# Patient Record
Sex: MALE | Race: ASIAN | Employment: OTHER | URBAN - METROPOLITAN AREA
[De-identification: names, ages, dates, MRNs, and addresses within clinical notes are randomized per-mention and may not be internally consistent; named-entity substitution may affect disease eponyms.]

---

## 2017-06-09 PROCEDURE — 84154 ASSAY OF PSA FREE: CPT | Performed by: INTERNAL MEDICINE

## 2017-06-09 PROCEDURE — 84153 ASSAY OF PSA TOTAL: CPT | Performed by: INTERNAL MEDICINE

## 2017-12-08 PROCEDURE — 84153 ASSAY OF PSA TOTAL: CPT | Performed by: INTERNAL MEDICINE

## 2017-12-08 PROCEDURE — 84154 ASSAY OF PSA FREE: CPT | Performed by: INTERNAL MEDICINE

## 2018-06-08 PROCEDURE — 84154 ASSAY OF PSA FREE: CPT | Performed by: INTERNAL MEDICINE

## 2018-06-08 PROCEDURE — 84153 ASSAY OF PSA TOTAL: CPT | Performed by: INTERNAL MEDICINE

## 2018-12-14 PROCEDURE — 84153 ASSAY OF PSA TOTAL: CPT | Performed by: INTERNAL MEDICINE

## 2018-12-14 PROCEDURE — 84154 ASSAY OF PSA FREE: CPT | Performed by: INTERNAL MEDICINE

## 2019-04-25 ENCOUNTER — APPOINTMENT (OUTPATIENT)
Dept: GENERAL RADIOLOGY | Facility: HOSPITAL | Age: 84
DRG: 188 | End: 2019-04-25
Attending: INTERNAL MEDICINE
Payer: MEDICARE

## 2019-04-25 ENCOUNTER — APPOINTMENT (OUTPATIENT)
Dept: ULTRASOUND IMAGING | Facility: HOSPITAL | Age: 84
DRG: 188 | End: 2019-04-25
Attending: INTERNAL MEDICINE
Payer: MEDICARE

## 2019-04-25 ENCOUNTER — HOSPITAL ENCOUNTER (INPATIENT)
Facility: HOSPITAL | Age: 84
LOS: 1 days | Discharge: HOME | DRG: 188 | End: 2019-04-26
Attending: INTERNAL MEDICINE | Admitting: HOSPITALIST
Payer: MEDICARE

## 2019-04-25 DIAGNOSIS — J90 PLEURAL EFFUSION EXUDATIVE: Primary | ICD-10-CM

## 2019-04-25 PROCEDURE — 89051 BODY FLUID CELL COUNT: CPT | Performed by: INTERNAL MEDICINE

## 2019-04-25 PROCEDURE — 0W993ZZ DRAINAGE OF RIGHT PLEURAL CAVITY, PERCUTANEOUS APPROACH: ICD-10-PCS | Performed by: INTERNAL MEDICINE

## 2019-04-25 PROCEDURE — 87070 CULTURE OTHR SPECIMN AEROBIC: CPT | Performed by: INTERNAL MEDICINE

## 2019-04-25 PROCEDURE — 88305 TISSUE EXAM BY PATHOLOGIST: CPT | Performed by: INTERNAL MEDICINE

## 2019-04-25 PROCEDURE — 76604 US EXAM CHEST: CPT | Performed by: INTERNAL MEDICINE

## 2019-04-25 PROCEDURE — 85610 PROTHROMBIN TIME: CPT | Performed by: INTERNAL MEDICINE

## 2019-04-25 PROCEDURE — 83615 LACTATE (LD) (LDH) ENZYME: CPT | Performed by: INTERNAL MEDICINE

## 2019-04-25 PROCEDURE — 71045 X-RAY EXAM CHEST 1 VIEW: CPT | Performed by: INTERNAL MEDICINE

## 2019-04-25 PROCEDURE — 82945 GLUCOSE OTHER FLUID: CPT | Performed by: INTERNAL MEDICINE

## 2019-04-25 PROCEDURE — 88108 CYTOPATH CONCENTRATE TECH: CPT | Performed by: INTERNAL MEDICINE

## 2019-04-25 PROCEDURE — 87205 SMEAR GRAM STAIN: CPT | Performed by: INTERNAL MEDICINE

## 2019-04-25 PROCEDURE — 84145 PROCALCITONIN (PCT): CPT | Performed by: INTERNAL MEDICINE

## 2019-04-25 PROCEDURE — 89050 BODY FLUID CELL COUNT: CPT | Performed by: INTERNAL MEDICINE

## 2019-04-25 PROCEDURE — 84157 ASSAY OF PROTEIN OTHER: CPT | Performed by: INTERNAL MEDICINE

## 2019-04-25 RX ORDER — FINASTERIDE 5 MG/1
5 TABLET, FILM COATED ORAL DAILY
Status: DISCONTINUED | OUTPATIENT
Start: 2019-04-26 | End: 2019-04-26

## 2019-04-25 RX ORDER — MULTIPLE VITAMINS W/ MINERALS TAB 9MG-400MCG
1 TAB ORAL DAILY
Status: DISCONTINUED | OUTPATIENT
Start: 2019-04-26 | End: 2019-04-26

## 2019-04-25 RX ORDER — ONDANSETRON 2 MG/ML
4 INJECTION INTRAMUSCULAR; INTRAVENOUS EVERY 6 HOURS PRN
Status: DISCONTINUED | OUTPATIENT
Start: 2019-04-25 | End: 2019-04-26

## 2019-04-25 RX ORDER — LOSARTAN POTASSIUM 25 MG/1
50 TABLET ORAL
Status: DISCONTINUED | OUTPATIENT
Start: 2019-04-26 | End: 2019-04-26

## 2019-04-25 RX ORDER — LEVOTHYROXINE SODIUM 0.07 MG/1
75 TABLET ORAL
Status: DISCONTINUED | OUTPATIENT
Start: 2019-04-26 | End: 2019-04-26

## 2019-04-25 RX ORDER — ACETAMINOPHEN 325 MG/1
650 TABLET ORAL EVERY 6 HOURS PRN
Status: DISCONTINUED | OUTPATIENT
Start: 2019-04-25 | End: 2019-04-26

## 2019-04-25 RX ORDER — NIFEDIPINE 60 MG/1
60 TABLET, EXTENDED RELEASE ORAL
Status: DISCONTINUED | OUTPATIENT
Start: 2019-04-26 | End: 2019-04-26

## 2019-04-25 NOTE — PLAN OF CARE
Problem: Patient/Family Goals  Goal: Patient/Family Long Term Goal  Description  Patient's Long Term Goal:     Interventions:  - See additional Care Plan goals for specific interventions  Outcome: Progressing  Goal: Patient/Family Short Term Goal  Descri

## 2019-04-25 NOTE — CONSULTS
Pulmonary Consult     Assessment / Plan:  1.  Pleural effusion - concern for malignant vs infectious etiology  - risks, benefits and alternatives to diagnostic and therapeutic thoracentesis discussed with patient and he agreed with proceeding  - follow-up p Disp:  Rfl:  4/25/2019 at 0900   Chlorhexidine Gluconate (PERIDEX) 0.12 % Mouth/Throat Solution  Disp:  Rfl: 99 Unknown at Unknown time       Outpatient Medications Marked as Taking for the 4/25/19 encounter [facility] Encounter):  LOSARTAN POTASSIUM 50 MG per week: Not on file        Minutes per session: Not on file      Stress: Not on file    Relationships      Social connections:        Talks on phone: Not on file        Gets together: Not on file        Attends Worship service: Not on file        Activ

## 2019-04-25 NOTE — PROGRESS NOTES
Thoracentesis Procedure Note    Indication: Large right pleural effusion    Pre-procedure: Risks, benefits, and alternatives were discussed with patient and they agreed with proceeding. Procedural pause performed.  Ultrasound was used to identify large righ

## 2019-04-25 NOTE — PROGRESS NOTES
NURSING ADMISSION NOTE      Patient admitted via Wheelchair  Oriented to room. Safety precautions initiated. Bed in low position. Call light in reach. Pt arrived as a Direct admit d/t SOB for 1-2 weeks.  CXR showing large right pleural effusio

## 2019-04-25 NOTE — H&P
[facility] Hospitalist H&P       CC: No chief complaint on file.        PCP: Chris Butt MD    History of Present Illness:  Patient is a 80year old male with PMH sig for BPH, HTN, HL, hypothryoidism presents with ~6 month h/o of progressively worsening SOB, re Infusing Medication:    PRN Medication:       Soc Hx  Social History    Tobacco Use      Smoking status: Former Smoker        Packs/day: 1.00        Years: 10.00        Pack years: 10        Types: Cigarettes        Quit date: 1/9/1962        Years since q identified extending from the hilum which may represent atelectasis although other etiologies cannot be excluded. The left lung is clear. Curvilinear calcifications are seen within the aorta. There is no pneumothorax or pulmonary edema.  IMPRESSION 1. Large

## 2019-04-26 VITALS
TEMPERATURE: 98 F | DIASTOLIC BLOOD PRESSURE: 52 MMHG | OXYGEN SATURATION: 99 % | HEART RATE: 74 BPM | WEIGHT: 122.56 LBS | RESPIRATION RATE: 18 BRPM | SYSTOLIC BLOOD PRESSURE: 100 MMHG | BODY MASS INDEX: 22 KG/M2

## 2019-04-26 NOTE — PLAN OF CARE
Assumed care of patient @ 7030. A/ox4, answers questions appropriately for age, anxious to get home. Pt denies SOB. POD #1 R. US guided thoracentesis, tegaderm and gauze intact, skin intact, no bruising noticed.  Pt was able to ambulate in halls with cane,

## 2019-04-26 NOTE — PROGRESS NOTES
Pulmonary Progress Note        NAME: Mayte Ayers - ROOM: 6889/5526-J - MRN: [id number] - Age: 80year old - : 1922        Last 24hrs: No events overnight, ready to go home    OBJECTIVE:   19  0019 19  0456 19  0900 19  1300 February 26,2019. The new INR reference range is 0.90-1. 10.        TSH   Date/Time Value Ref Range Status   06/08/2018 09:20 AM 1.548 0.350 - 5.500 uIU/mL Final   06/09/2017 09:23 AM 1.867 0.350 - 5.500 uIU/mL Final   06/05/2015 09:56 AM 7.680 (H) 0.450 - 4

## 2019-04-26 NOTE — CM/SW NOTE
Patient discussed in rounds, may benefit from home health services, will have [facility] liaison offer services/choice.       Kristie Gibbons RN,   Phone [phone]  Pager [phone]

## 2019-04-26 NOTE — PLAN OF CARE
ASLEEP DURING ROUNDS BUT EASILY AROUSABLE ON TELE MONITOR HR 70'S SINUS RHYTHM. DENIES ANY PAIN. ALL NEEDS ATTENDED AND WILL CONTINUE TO MONITOR. CALL LIGHT WITHIN REACH.

## 2019-04-26 NOTE — DIETARY NOTE
[address]     Admitting diagnosis:  pleural effusion    Ht:  5'3\"  Wt: 55.6 kg (122 lb 9.2 oz). This is 98 % of IBW  Body mass index is 21.71 kg/m².   IBW: 56.4kg    Labs/Meds reviewed    Diet: Orders Placed This E

## 2019-04-26 NOTE — HOME CARE LIAISON
Received referral from [address] Met with pt and his daughter at the bedside. Pt is declining home health services at this time. Kim donovan.  Thank you for the referral.

## 2019-04-26 NOTE — DISCHARGE SUMMARY
General Medicine Discharge Summary     Patient ID:  Rosetta Rush  80year old  1/17/1922    Admit date: 4/25/2019    Discharge date and time: 4/26/2019  3:10 PM     Attending Physician: Lelo att. providers fo no dysuria     # HTN, HL  - cont OP regimen     # Hypothryoidism  - OP TFTs 6/2018 wnl  - cont OP regimen     # elevated PSA  - OP urology notes reviewed (7/2018) - very likely pt has prostate CA. Pt declined prostate biopsy. He has been on finasteride.     Technologist)  Patient offered no additional history at this time. FINDINGS:  No measurable pneumothorax. Cardiac silhouette is normal in size. Tortuous thoracic aorta. Aortic atherosclerosis. Mild to moderate right pleural effusion.   Right-sided vo

## 2019-04-26 NOTE — PROGRESS NOTES
Pt ambulated this AM w/walker, no o2 needed, sats 93-94% when walking.         04/26/19 0900   Mobility   Activity Ambulate in scott   Level of Assistance Independent after set-up   Assistive Device Cane   Distance (ft) 400   Ambulation Response Tolerated we

## 2019-04-29 NOTE — CM/SW NOTE
04/29/19 0842   Discharge disposition   Expected discharge disposition Home or Self   Name of [id number]   Patient Refuses Rehab Services Yes   denied home health services.

## 2019-05-03 ENCOUNTER — HOSPITAL ENCOUNTER (OUTPATIENT)
Dept: ULTRASOUND IMAGING | Facility: HOSPITAL | Age: 84
Discharge: HOME | End: 2019-05-03
Attending: INTERNAL MEDICINE
Payer: MEDICARE

## 2019-05-03 ENCOUNTER — APPOINTMENT (OUTPATIENT)
Dept: LAB | Facility: HOSPITAL | Age: 84
End: 2019-05-03
Attending: INTERNAL MEDICINE
Payer: MEDICARE

## 2019-05-03 ENCOUNTER — HOSPITAL ENCOUNTER (OUTPATIENT)
Dept: GENERAL RADIOLOGY | Facility: HOSPITAL | Age: 84
Discharge: HOME | End: 2019-05-03
Attending: INTERNAL MEDICINE
Payer: MEDICARE

## 2019-05-03 DIAGNOSIS — J90 RECURRENT RIGHT PLEURAL EFFUSION: ICD-10-CM

## 2019-05-03 PROCEDURE — 88108 CYTOPATH CONCENTRATE TECH: CPT | Performed by: INTERNAL MEDICINE

## 2019-05-03 PROCEDURE — 71045 X-RAY EXAM CHEST 1 VIEW: CPT | Performed by: INTERNAL MEDICINE

## 2019-05-03 PROCEDURE — 32555 ASPIRATE PLEURA W/ IMAGING: CPT | Performed by: INTERNAL MEDICINE

## 2019-05-03 PROCEDURE — 88305 TISSUE EXAM BY PATHOLOGIST: CPT | Performed by: INTERNAL MEDICINE

## 2019-05-03 NOTE — PROCEDURES
Thoracentesis Procedure Note    Pre-operative Diagnosis: pleural effusion    Post-operative Diagnosis: same    Indications: pleural effusion    Procedure Details   Consent: Informed consent was obtained.  Risks of the procedure were discussed including: inf

## 2019-05-08 ENCOUNTER — APPOINTMENT (OUTPATIENT)
Dept: ULTRASOUND IMAGING | Facility: HOSPITAL | Age: 84
DRG: 187 | End: 2019-05-08
Attending: INTERNAL MEDICINE
Payer: MEDICARE

## 2019-05-08 ENCOUNTER — HOSPITAL ENCOUNTER (INPATIENT)
Facility: HOSPITAL | Age: 84
LOS: 12 days | Discharge: HOME | DRG: 187 | End: 2019-05-20
Attending: INTERNAL MEDICINE | Admitting: INTERNAL MEDICINE
Payer: MEDICARE

## 2019-05-08 DIAGNOSIS — J90 PLEURAL EFFUSION: Primary | ICD-10-CM

## 2019-05-08 PROCEDURE — 0W9930Z DRAINAGE OF RIGHT PLEURAL CAVITY WITH DRAINAGE DEVICE, PERCUTANEOUS APPROACH: ICD-10-PCS | Performed by: RADIOLOGY

## 2019-05-08 PROCEDURE — 99152 MOD SED SAME PHYS/QHP 5/>YRS: CPT | Performed by: INTERNAL MEDICINE

## 2019-05-08 PROCEDURE — 32557 INSERT CATH PLEURA W/ IMAGE: CPT | Performed by: INTERNAL MEDICINE

## 2019-05-08 PROCEDURE — 85610 PROTHROMBIN TIME: CPT | Performed by: INTERNAL MEDICINE

## 2019-05-08 PROCEDURE — 85025 COMPLETE CBC W/AUTO DIFF WBC: CPT | Performed by: INTERNAL MEDICINE

## 2019-05-08 RX ORDER — ACETAMINOPHEN 325 MG/1
650 TABLET ORAL EVERY 6 HOURS PRN
Status: DISCONTINUED | OUTPATIENT
Start: 2019-05-08 | End: 2019-05-20

## 2019-05-08 RX ORDER — LEVOTHYROXINE SODIUM 0.07 MG/1
75 TABLET ORAL DAILY
Status: DISCONTINUED | OUTPATIENT
Start: 2019-05-09 | End: 2019-05-20

## 2019-05-08 RX ORDER — FLUMAZENIL 0.1 MG/ML
0.2 INJECTION, SOLUTION INTRAVENOUS AS NEEDED
Status: DISCONTINUED | OUTPATIENT
Start: 2019-05-08 | End: 2019-05-14 | Stop reason: HOSPADM

## 2019-05-08 RX ORDER — NIFEDIPINE 60 MG/1
60 TABLET, EXTENDED RELEASE ORAL
Status: DISCONTINUED | OUTPATIENT
Start: 2019-05-09 | End: 2019-05-11

## 2019-05-08 RX ORDER — FINASTERIDE 5 MG/1
5 TABLET, FILM COATED ORAL NIGHTLY
Status: DISCONTINUED | OUTPATIENT
Start: 2019-05-08 | End: 2019-05-10

## 2019-05-08 RX ORDER — LOSARTAN POTASSIUM 50 MG/1
50 TABLET ORAL
Status: DISCONTINUED | OUTPATIENT
Start: 2019-05-09 | End: 2019-05-11

## 2019-05-08 RX ORDER — NALOXONE HYDROCHLORIDE 0.4 MG/ML
INJECTION, SOLUTION INTRAMUSCULAR; INTRAVENOUS; SUBCUTANEOUS
Status: DISCONTINUED
Start: 2019-05-08 | End: 2019-05-08 | Stop reason: WASHOUT

## 2019-05-08 RX ORDER — ONDANSETRON 2 MG/ML
4 INJECTION INTRAMUSCULAR; INTRAVENOUS EVERY 6 HOURS PRN
Status: DISCONTINUED | OUTPATIENT
Start: 2019-05-08 | End: 2019-05-20

## 2019-05-08 RX ORDER — NALOXONE HYDROCHLORIDE 0.4 MG/ML
80 INJECTION, SOLUTION INTRAMUSCULAR; INTRAVENOUS; SUBCUTANEOUS AS NEEDED
Status: DISCONTINUED | OUTPATIENT
Start: 2019-05-08 | End: 2019-05-14 | Stop reason: HOSPADM

## 2019-05-08 RX ORDER — MIDAZOLAM HYDROCHLORIDE 1 MG/ML
1 INJECTION INTRAMUSCULAR; INTRAVENOUS EVERY 5 MIN PRN
Status: ACTIVE | OUTPATIENT
Start: 2019-05-08 | End: 2019-05-08

## 2019-05-08 RX ORDER — MIDAZOLAM HYDROCHLORIDE 1 MG/ML
INJECTION INTRAMUSCULAR; INTRAVENOUS
Status: DISCONTINUED
Start: 2019-05-08 | End: 2019-05-08 | Stop reason: WASHOUT

## 2019-05-08 RX ORDER — FLUMAZENIL 0.1 MG/ML
INJECTION, SOLUTION INTRAVENOUS
Status: DISCONTINUED
Start: 2019-05-08 | End: 2019-05-08 | Stop reason: WASHOUT

## 2019-05-08 RX ORDER — SODIUM CHLORIDE 9 MG/ML
INJECTION, SOLUTION INTRAVENOUS CONTINUOUS
Status: DISCONTINUED | OUTPATIENT
Start: 2019-05-08 | End: 2019-05-14 | Stop reason: HOSPADM

## 2019-05-08 NOTE — CONSULTS
Pulmonary H&P/Consult       NAME: Beatris Jones - ROOM: [facility]162-J - MRN: [id number] - Age: 80year old - :  1922    Date of Admission: 2019  8:16 AM  Admission Diagnosis: complex right pleural effusion  Reason for consult: complex pleural effusion daily. Disp:  Rfl:  5/8/2019 at Unknown time   Chlorhexidine Gluconate (PERIDEX) 0.12 % Mouth/Throat Solution  Disp:  Rfl: 99 Unknown at Unknown time   LOW-DOSE ASPIRIN OR Take  by mouth.  Disp:  Rfl:  Unknown at Unknown time     No Known Allergies    Socia sexual activity: Not on file    Other Topics      Concerns:        Not on file    Social History Narrative      Not on file         Family History:  Family History   Problem Relation Age of Onset   • Stroke Father    • No Known Problems Mother         Home Wt Readings from Last 3 Encounters:  05/08/19 : 127 lb 4.8 oz (57.7 kg)  05/02/19 : 125 lb (56.7 kg)  04/26/19 : 122 lb 9.2 oz (55.6 kg)      No intake or output data in the 24 hours ending 05/08/19 1118    /66 (BP Location: Left arm)   Pulse 7 ALPHOS, TBIL, DBIL, TPROT    Invalid input(s): ARTERIALPH, ARTERIALPO2, ARTERIALPCO2, ARTERIALHCO3    No results for input(s): BNP in the last 72 hours.     Invalid input(s): TROPI    INR   Date/Time Value Ref Range Status   05/08/2019 09:44 AM 0.86 (L) 0.9

## 2019-05-08 NOTE — H&P
[facility] Hospitalist History and Physical      No chief complaint on file.        PCP: Levi Pollock MD      History of Present Illness: Patient is a 80year old male with PMH sig for HTN, BPH, and hypothyroidism who presented for CT guided chest tube placement f Years: 10.00        Pack years: 10        Types: Cigarettes        Start date: 1940        Quit date: 1970        Years since quittin.3      Smokeless tobacco: Never Used      Tobacco comment: stop smoking cig /Smoke cigar for 8 yrs    [facility] Radiology: Xr Chest Pa + Lat Chest (cpt=71046)    Result Date: 5/2/2019  DATE OF SERVICE: 05.02.2019 CHEST PA AND LATERAL CLINICAL INFORMATION: Pleural effusion, not elsewhere classified COMPARISON STUDY: 4/25/2019.  TECHNIQUE: PA and left lateral views available.  TECHNIQUE:  Axial images of the chest were performed from the thoracic inlet through the adrenal glands without the use of intravenous contrast.  The data was reconstructed into 1.25 mm collimated axial images and coronal and sagittal reformats portions of the right middle lobe containing air bronchograms and may simply represent compressive atelectasis. Underlying mass or pneumonia cannot be excluded. 3. 5 mm nodule in the left apex. Comparison with any previous exam is recommended.  4. Scattered Tortuous thoracic aorta. Aortic atherosclerosis. Mild to moderate right pleural effusion. Right-sided volume loss noted. Right basilar consolidation is seen. CONCLUSION:  No measurable pneumothorax.     Dictated by: Tracey Orellana MD on 4/25/20 continuous pulse oximetry and cardiac monitoring under my direct supervision. The radiology nurse was in attendance throughout the exam.  Fifty mcg of Fentanyl was given.   Patient was assessed and monitoring of oxygen saturation, heart rate, and blood pre levothyroxine     #BPH  - cont finasteride     DVT prophy - SCD  Dispo: inpt care. Will require at least two midnights for complex pulmonary care. Plan of care d/w pt and daughter who agree. Outpatient records or previous hospital records reviewed.

## 2019-05-08 NOTE — PROGRESS NOTES
Pt is a 81 y/o male  direct admitted for chest tube placement due to recurrent pleural effusion. came to the room at 0850. alert oriented.hard of hearing. daughter at bedside. POC updated with pt. NPO. Chest tube placement at 1300 per IR.   S/p chest tube place

## 2019-05-08 NOTE — PROCEDURES
[facility]  Procedure Note    Coralee Cushing Patient Status:  Inpatient    1922 MRN [id number]   [facility] 5NW-A Attending Constantin Terry MD   Hosp Day # 0 PCP Casimiro Hammonds MD     Procedure: Chest tube placement    Pre-Proced

## 2019-05-08 NOTE — IMAGING NOTE
Pt tolerated procedure well-procedure done with local only at the pt's request. Vss on RA, presently denies pain and dressing is CDI.  Pt and family updated on procedure and plan of care, report called to Marlen RN and awaiting transport with Radiology R

## 2019-05-09 PROBLEM — J90 PLEURAL EFFUSION: Status: ACTIVE | Noted: 2019-05-09

## 2019-05-09 PROCEDURE — 97530 THERAPEUTIC ACTIVITIES: CPT

## 2019-05-09 PROCEDURE — 85025 COMPLETE CBC W/AUTO DIFF WBC: CPT | Performed by: INTERNAL MEDICINE

## 2019-05-09 PROCEDURE — 80048 BASIC METABOLIC PNL TOTAL CA: CPT | Performed by: INTERNAL MEDICINE

## 2019-05-09 PROCEDURE — 97165 OT EVAL LOW COMPLEX 30 MIN: CPT

## 2019-05-09 PROCEDURE — 97162 PT EVAL MOD COMPLEX 30 MIN: CPT

## 2019-05-09 PROCEDURE — 97535 SELF CARE MNGMENT TRAINING: CPT

## 2019-05-09 RX ORDER — ACETAMINOPHEN 160 MG
2000 TABLET,DISINTEGRATING ORAL DAILY
Status: DISCONTINUED | OUTPATIENT
Start: 2019-05-10 | End: 2019-05-20

## 2019-05-09 RX ORDER — BISACODYL 10 MG
10 SUPPOSITORY, RECTAL RECTAL
Status: DISCONTINUED | OUTPATIENT
Start: 2019-05-09 | End: 2019-05-20

## 2019-05-09 NOTE — PLAN OF CARE
Problem: Patient/Family Goals  Goal: Patient/Family Long Term Goal  Description  Patient's Long Term Goal: 5/8(pm) to go home    Interventions:  - get chest tube out   - See additional Care Plan goals for specific interventions   Outcome: Progressing  Go

## 2019-05-09 NOTE — PROGRESS NOTES
[facility] Hospitalist Progress Note     PCP: Nuzhat Boyle MD    CC:  Follow up    SUBJECTIVE:  Pt sitting up in bed, family member at bedside  Pain controlled  No cp/sob/n/v/f/c. +U, +BM    OBJECTIVE:  Temp:  [97.5 °F (36.4 °C)-98.9 °F (37.2 °C)] 97.6 °F (36 and nifedipine      #Hypothryroidism   - cont levothyroxine      #BPH  - cont finasteride     **hyponatremia- mild, not significant at this time     DVT prophy - SCD, ppx AC when ok with pulm     D/w AUBRIE Nunez    Questions/concerns were discussed with patient

## 2019-05-09 NOTE — PROGRESS NOTES
Francisco Shay 73 Patient Status:  Inpatient    1922 MRN [id number]   [facility] 5NW-A Attending Cherise Busby, *   Hosp Day # 1 PCP Silver Ravi MD     Pulm / Critical Care Progress Note     S: ct with > 2L outpu Value   06/05/2015 0.81   11/14/2014 0.82   05/30/2014 0.75 (L)     Creatinine (mg/dL)   Date Value   05/09/2019 0.97   04/25/2019 1.27   12/14/2018 0.98   06/08/2018 0.90   ]    No results for input(s): ALKPHOS, ALT, AST in the last 168 hours.     Invalid

## 2019-05-09 NOTE — CM/SW NOTE
Met with pt who is a 81 y/o man admitted for recurrent pleural effusions, s/p chest tube placement with plan for MIST 2 protocol per pulmonology. Pt lives with his wife and stated he has been active and independent prior to admission.   Pt is still driving

## 2019-05-09 NOTE — OCCUPATIONAL THERAPY NOTE
OCCUPATIONAL THERAPY QUICK EVALUATION - INPATIENT    Room Number: 887/112-Y  Evaluation Date: 5/9/2019     Type of Evaluation: Initial  Presenting Problem: chest tube placement    Physician Order: IP Consult to Occupational Therapy  Reason for Therapy:  AD ACTIVITIES OF DAILY LIVING ASSESSMENT  AM-PAC ‘6-Clicks’ Inpatient Daily Activity Short Form   How much help from another person does the patient currently need…  -   Putting on and taking off regular lower body clothing?: A Little   -   Bathing (in deficits impacting engagement in ADL/IADL  LOW  1 - 3 performance deficits    Client Assessment/Performance Deficits  LOW - No comorbidities nor modifications of tasks    Clinical Decision Making  LOW - Analysis of occupational profile, problem-focused ass

## 2019-05-09 NOTE — PHYSICAL THERAPY NOTE
PHYSICAL THERAPY QUICK EVALUATION - INPATIENT    Room Number: 688/469-P  Evaluation Date: 5/9/2019  Presenting Problem: pleural effusion  Physician Order: PT Eval and Treat    Problem List  Active Problems:    Pleural effusion      Past Medical History bed?: None   How much help from another person does the patient currently need. ..   -   Moving to and from a bed to a chair (including a wheelchair)?: None   -   Need to walk in hospital room?: None   -   Climbing 3-5 steps with a railing?: A Little functional mobility. RN aware. GOALS  Patient was able to achieve the following goals . ..     Patient was able to transfer Safely and independently   Patient able to ambulate on level surfaces Safely and independently

## 2019-05-09 NOTE — PROGRESS NOTES
Pt is a 81 y/o male  direct admitted for chest tube placement due to recurrent pleural effusion. s/p chest tube placement,good amount of output yesterday and slowing down today. Chest tube to -20 wall suction,hard of hearing. chest tube is intact. denies feve

## 2019-05-09 NOTE — PLAN OF CARE
Patient alert and orientated. Oxygen WNL on room air. Pt with no complaints of pain. Medications given per MAR. IV SL. Chest tube to -20 wall suction, no output this shift. Pt voiding per urinal. Instructed to call for help, call light within reach.  WCTM

## 2019-05-09 NOTE — PROGRESS NOTES
[facility]  Progress Note      Meera Lemon Patient Status:  Inpatient    1922 MRN [id number]   [facility] 5NW-A Attending Sandra Gtz, *   Hosp Day # 1 PCP Kallie Mason MD       Subjective:   Resting in bed    Objective

## 2019-05-10 ENCOUNTER — APPOINTMENT (OUTPATIENT)
Dept: GENERAL RADIOLOGY | Facility: HOSPITAL | Age: 84
DRG: 187 | End: 2019-05-10
Attending: INTERNAL MEDICINE
Payer: MEDICARE

## 2019-05-10 PROCEDURE — 71046 X-RAY EXAM CHEST 2 VIEWS: CPT | Performed by: INTERNAL MEDICINE

## 2019-05-10 PROCEDURE — 85025 COMPLETE CBC W/AUTO DIFF WBC: CPT | Performed by: HOSPITALIST

## 2019-05-10 RX ORDER — HEPARIN SODIUM 5000 [USP'U]/ML
5000 INJECTION, SOLUTION INTRAVENOUS; SUBCUTANEOUS EVERY 12 HOURS SCHEDULED
Status: DISCONTINUED | OUTPATIENT
Start: 2019-05-10 | End: 2019-05-20

## 2019-05-10 RX ORDER — FINASTERIDE 5 MG/1
5 TABLET, FILM COATED ORAL DAILY
Status: DISCONTINUED | OUTPATIENT
Start: 2019-05-11 | End: 2019-05-20

## 2019-05-10 NOTE — PROGRESS NOTES
Pulmonary Progress Note        NAME: Meera Lemon - ROOM: 071/633-X - MRN: [id number] - Age: 80year old - : 1922        Last 24hrs: No events overnight, wants to go home    OBJECTIVE:   05/09/19  2050 05/10/19  0519 05/10/19  0745 05/10/19  1015   B TROPI    INR   Date/Time Value Ref Range Status   05/08/2019 09:44 AM 0.86 (L) 0.90 - 1.10 Final     Comment:       New lot of PT reagent started on February 26,2019. The new INR reference range is 0.90-1. 10.        TSH   Date/Time Value Ref Range Status

## 2019-05-10 NOTE — PLAN OF CARE
Patient alert and orientated. Oxygen WNL on room air. Pt with no complaints of pain. Medications given per MAR. IV SL. Chest tube to -20 wall suction, 150ml out this shift. Pt voiding per urinal. Instructed to call for help, call light within reach.  WCTM

## 2019-05-10 NOTE — PROGRESS NOTES
Pt is a 79 y/o male admitted for chest tube placement due to recurrent pleural effusion. s/p chest tube placement . output slowing down today. Chest tube to -20 wall suction,water seal during ambulation and tests. hard of hearing. chest tube is intact. BP low s

## 2019-05-10 NOTE — PROGRESS NOTES
[facility] Hospitalist Progress Note     PCP: Kathleen Gonzalez MD    CC:  Follow up    SUBJECTIVE:  Pt sitting up in bed, just returned from CXR. No pain.  No n/v/f/c. comfortable    OBJECTIVE:  Temp:  [97.6 °F (36.4 °C)-98 °F (36.7 °C)] 97.8 °F (36.6 °C)  Pulse fluid    **expected anemia- HDS, monitor.  stable     #Essential HTN  - cont losartan and nifedipine      #Hypothryroidism   - cont levothyroxine      #BPH  - cont finasteride     **hyponatremia- mild, not significant at this time     DVT prophy - SCD, ppx

## 2019-05-11 NOTE — PLAN OF CARE
Pt a&o x4. [place]. Glasses. BP running on lower side, BP meds held. Otherwise VSS. On ra maintaining O2 sats. IS encouraged. Heparin. CT to -20 continuous suction, serosanguinous fluid noted. BM today. Voids. Up with 1.  Pt updated on poc, will continue to rou

## 2019-05-11 NOTE — PROGRESS NOTES
[facility] Hospitalist Progress Note     Santo Quesada Patient Status:  Inpatient    1922 MRN [id number]   [facility] 5NW-A Attending Eva Potter MD   [facility] Day # 3 PCP Levi Pollock MD     CC: follow up    SUBJECTIVE:  TOBY overnight  No Naloxone HCl, Flumazenil, acetaminophen, ondansetron HCl        Assessment/Plan:      # Loculated R pleural effusion   - s/p R chest tube  - exudative, bloody  - chest tube management per pulm     # Expected anemia  - stable  - HDS, monitor     # Essential

## 2019-05-11 NOTE — PROGRESS NOTES
Francisco Shay 73 Patient Status:  Inpatient    1922 MRN [id number]   [facility] 5NW-A Attending Flash Gonzalez MD   [address] Day # 3 PCP Anthony Burgos MD     Pulm / Critical Care Progress Note     S: ongoing output from ct, 05/09/19  0609   *   K 4.1      CO2 24.0   BUN 20*     Creatinine, Serum (mg/dL)   Date Value   06/05/2015 0.81   11/14/2014 0.82   05/30/2014 0.75 (L)     Creatinine (mg/dL)   Date Value   05/09/2019 0.97   04/25/2019 1.27   12/14/2018 0.98

## 2019-05-12 NOTE — PROGRESS NOTES
[facility] Hospitalist Progress Note     Meera Lemon Patient Status:  Inpatient    1922 MRN [id number]   [facility] 5NW-A Attending Lisseth Combs MD   [address] Day # 4 PCP Kallie Mason MD     CC: follow up    SUBJECTIVE:  CT output ~260/24 Medication:bisacodyl, Naloxone HCl, Flumazenil, acetaminophen, ondansetron HCl        Assessment/Plan:      # Loculated R pleural effusion   - s/p R chest tube  - exudative, bloody  - chest tube management per pulm  - plan to remove when output <200mL/24 h

## 2019-05-12 NOTE — PLAN OF CARE
Pt a&o x4. [place]. Glasses. VSS. Denies pain. On ra maintaining O2 sats. IS encouraged. Heparin. CT to -20 continuous suction, yellow fluid noted. Suppository given at patient request. Voids. Up with 1. Pt walked around room several times today.  Pt updated on

## 2019-05-12 NOTE — PROGRESS NOTES
ASSUMED PATIENT  CARE AT 0100. PATIENT WAS SOUNDLY ASLEEP. LAST VITAL SIGNS TAKEN AT 2018 ARE  WNL. HE HAS A RIGHT CHEST TUBE IN PLACE ATTACHED TO CONTINUOUS SUCTION. HIS BED ALARM IS ON FOR SAFETY. WILL FOLLOW.

## 2019-05-12 NOTE — PROGRESS NOTES
Francisco Zenobiamaycobeena 73 Patient Status:  Inpatient    1922 MRN [id number]   [facility] 5NW-A Attending Jesus Strickland MD   [facility] Day # 4 PCP Ted Weiss MD     Pulm / Critical Care Progress Note     S: ct drained nearly 300cc Creatinine, Serum (mg/dL)   Date Value   06/05/2015 0.81   11/14/2014 0.82   05/30/2014 0.75 (L)     Creatinine (mg/dL)   Date Value   05/09/2019 0.97   04/25/2019 1.27   12/14/2018 0.98   06/08/2018 0.90   ]    No results for input(s): ALKPHOS, ALT, A

## 2019-05-13 ENCOUNTER — APPOINTMENT (OUTPATIENT)
Dept: GENERAL RADIOLOGY | Facility: HOSPITAL | Age: 84
DRG: 187 | End: 2019-05-13
Attending: INTERNAL MEDICINE
Payer: MEDICARE

## 2019-05-13 PROCEDURE — 80048 BASIC METABOLIC PNL TOTAL CA: CPT | Performed by: INTERNAL MEDICINE

## 2019-05-13 PROCEDURE — 71045 X-RAY EXAM CHEST 1 VIEW: CPT | Performed by: INTERNAL MEDICINE

## 2019-05-13 PROCEDURE — 85027 COMPLETE CBC AUTOMATED: CPT | Performed by: INTERNAL MEDICINE

## 2019-05-13 NOTE — PROGRESS NOTES
[facility]  Progress Note      Deisi Otto Patient Status:  Inpatient    1922 MRN [id number]   [facility] 5NW-A Attending Alhaji Max MD   [facility] Day # 5 PCP Ismael Mathis MD       80year-old male with loculated right pleu

## 2019-05-13 NOTE — PROGRESS NOTES
[facility] Hospitalist Progress Note     Ann-Marie Neves Patient Status:  Inpatient    1922 MRN [id number]   [facility] 5NW-A Attending Brian Baer MD   [address] Day # 5 PCP Charles Hedrick MD     CC: follow up    SUBJECTIVE:  No issues.   No fve HCl, Flumazenil, acetaminophen, ondansetron HCl        Assessment/Plan:      # Loculated R pleural effusion   - s/p R chest tube  - exudative, bloody- 240 cc out the last 24 hours- chest tube management per pulm     # Expected anemia  - stable  - HDS, abhijit

## 2019-05-13 NOTE — PROGRESS NOTES
Francisco Laurita 73 Patient Status:  Inpatient    1922 MRN [id number]   [facility] 5NW-A Attending Diana Sneed MD   [facility] Day # 5 PCP Ayanna Domínguez MD     SUBJECTIVE: Pt wants to go home.   Is frustrated chest tube ou clubbing or cyanosis. no edema                          Skin: No rashes or lesions.        Lab Results   Component Value Date    WBC 5.0 05/13/2019    RBC 3.13 05/13/2019    HGB 10.0 05/13/2019    HCT 30.2 05/13/2019    MCV 96.5 05/13/2019    MCH 31.9 05/13

## 2019-05-13 NOTE — PLAN OF CARE
Problem: Pleural Effusion    Data: Pt alert and orientated times three. On room air, SATs WNL, no c/o shortness of breath. Denies any pain. Pt up and ambulating in the room and tolerated activity well. Dressing to chest tube is clean, dry, and intact.  Ches FREE FROM FALL/INJURY   5/12 am: \"to walk! \"  5/12/19 NOC: \"get some sleep\"  Interventions:   5/10-chest tube care,cxr today. 5/9-chest tube care  05/11/2019 - BED ALARM  5/11/19-bed alarm.     5/12/19-cluster care  5/13 AM: To get chest tube out.   - S

## 2019-05-14 ENCOUNTER — APPOINTMENT (OUTPATIENT)
Dept: CT IMAGING | Facility: HOSPITAL | Age: 84
DRG: 187 | End: 2019-05-14
Attending: INTERNAL MEDICINE
Payer: MEDICARE

## 2019-05-14 PROCEDURE — 80048 BASIC METABOLIC PNL TOTAL CA: CPT | Performed by: INTERNAL MEDICINE

## 2019-05-14 PROCEDURE — 71250 CT THORAX DX C-: CPT | Performed by: INTERNAL MEDICINE

## 2019-05-14 NOTE — PLAN OF CARE
Problem: Patient/Family Goals  Goal: Patient/Family Long Term Goal  Description  Patient's Long Term Goal: 5/8(pm) to go home    Interventions:  - get chest tube out   - See additional Care Plan goals for specific interventions   Outcome: Progressing  Go discharge: chest tube    Anticipated discharge date: tbd    Current discharge plan: home    Back up discharge plan: na

## 2019-05-14 NOTE — PROGRESS NOTES
[facility]  Progress Note      Saskia Galvez Patient Status:  Inpatient    1922 MRN [id number]   [facility] 5NW-A Attending Daryl Alvarez MD   [facility] Day # 6 PCP Renny Santos MD       Right chest tube output 240 cc/24 hr, Tere Pardo

## 2019-05-14 NOTE — PLAN OF CARE
Patient alert and orientated. Oxygen WNL on room air, . No complaints of pain, medications given per MAR. Tolerating diet. Chest tube to -20 suction. Up to bedside commode. Pt instructed to call for help, call light within reach. Pt updated on POC.  WCTM

## 2019-05-14 NOTE — PROGRESS NOTES
[facility] Hospitalist Progress Note     Saskia Galvez Patient Status:  Inpatient    1922 MRN [id number]   [facility] 5NW-A Attending Kingsley Verde MD   [facility] Day # 6 PCP Renny Santos MD     CC: follow up    SUBJECTIVE:  No issues.   No fev Medication:  • Heparin Sodium (Porcine)  5,000 Units Subcutaneous 2 times per day   • finasteride  5 mg Oral Daily   • cholecalciferol  2,000 Units Oral Daily   • Levothyroxine Sodium  75 mcg Oral Daily     Continuous Infusing Medication:  • sodium chlorid

## 2019-05-14 NOTE — PROGRESS NOTES
Francisco Shay 73 Patient Status:  Inpatient    1922 MRN [id number]   [facility] 5NW-A Attending Sheridan Key MD   [facility] Day # 6 PCP Aman Arthur MD     SUBJECTIVE: Pt denies complaints.   Chest tube output remains clubbing or cyanosis. no edema                          Skin: No rashes or lesions.      Lab Results   Component Value Date     05/14/2019    K 3.9 05/14/2019     05/14/2019    CO2 26.0 05/14/2019    BUN 27 05/14/2019    CREATSERUM 1.13 05/14/20

## 2019-05-15 ENCOUNTER — APPOINTMENT (OUTPATIENT)
Dept: ULTRASOUND IMAGING | Facility: HOSPITAL | Age: 84
DRG: 187 | End: 2019-05-15
Attending: INTERNAL MEDICINE
Payer: MEDICARE

## 2019-05-15 PROCEDURE — 86038 ANTINUCLEAR ANTIBODIES: CPT | Performed by: INTERNAL MEDICINE

## 2019-05-15 PROCEDURE — 87205 SMEAR GRAM STAIN: CPT | Performed by: PHYSICIAN ASSISTANT

## 2019-05-15 PROCEDURE — 83876 ASSAY MYELOPEROXIDASE: CPT | Performed by: INTERNAL MEDICINE

## 2019-05-15 PROCEDURE — 76770 US EXAM ABDO BACK WALL COMP: CPT | Performed by: INTERNAL MEDICINE

## 2019-05-15 PROCEDURE — 87070 CULTURE OTHR SPECIMN AEROBIC: CPT | Performed by: PHYSICIAN ASSISTANT

## 2019-05-15 PROCEDURE — 85652 RBC SED RATE AUTOMATED: CPT | Performed by: INTERNAL MEDICINE

## 2019-05-15 PROCEDURE — 86431 RHEUMATOID FACTOR QUANT: CPT | Performed by: INTERNAL MEDICINE

## 2019-05-15 PROCEDURE — 86140 C-REACTIVE PROTEIN: CPT | Performed by: INTERNAL MEDICINE

## 2019-05-15 NOTE — PROGRESS NOTES
Patient alert and orientated. Oxygen WNL on room air. Pt with no complaints of pain. Medications given per MAR. IV SL. Voiding. Pt with chest tube to -20 suction. 130ml out this shift. Pt instructed to call for help, call light within reach.  Pt updated on

## 2019-05-15 NOTE — PROGRESS NOTES
[facility] Hospitalist Progress Note     Morris Arguello Patient Status:  Inpatient    1922 MRN [id number]   [facility] 5NW-A Attending Susan Vazquez MD   [facility] Day # 7 PCP Silver Ravi MD     CC: follow up    SUBJECTIVE:   No issues.   No fe measuring 39 mm in diameter. 4. There are a few partially calcified lymph nodes involving the right hilum and subcarinal region, suspicious for old granulomatous changes. 5. Please see further details above. Dictated by:  Rubio Fernando,  on 5/14/2019 at

## 2019-05-15 NOTE — CONSULTS
Thoracic Surgery Consult    Reason for Consultation: recurrent pleural effusion    Consulting Physician: Stepan DONIS    Chief Complaint: \"fluid in my chest\"    History of Present Illness: Patient is a 80year old, male with PMHx HTN and hypothyroidism Review of Systems:    A 10 point review of systems was conducted and was negative except that which was  listed in the above HPI as well as:  Pertinent negatives include:    - No unusual headaches, weakness or numbness  - No chest pain  - No shortn thoracentesis in April and again in May. No malignancy or infection found. After the 2nd thoracentesis patient had a CT scan which showed recurrence. Admitted on 5/7/19 and pigtail catheter was placed by IR. Has had significant output.  CT with some residua

## 2019-05-15 NOTE — PROGRESS NOTES
[facility]  Progress Note      Rosetta Adri Patient Status:  Inpatient    1922 MRN [id number]   [facility] 5NW-A Attending Steve Cartwright MD   [facility] Day # 7 PCP Daniel Mendoza MD       Subjective:   Resting in bed    Objective:

## 2019-05-15 NOTE — PROGRESS NOTES
Pulmonary Progress Note        NAME: Ann-Marie Neves - ROOM: 837/568-D - MRN: [id number] - Age: 80year old - : 1922        Last 24hrs: No events overnight, still wants to go home    OBJECTIVE:   19  0622 19  1140 05/14/19  2022 05/15/19  05 Range Status   05/08/2019 09:44 AM 0.86 (L) 0.90 - 1.10 Final     Comment:       New lot of PT reagent started on February 26,2019. The new INR reference range is 0.90-1. 10.        TSH   Date/Time Value Ref Range Status   06/08/2018 09:20 AM 1.548 0.350 - 5

## 2019-05-15 NOTE — DIETARY NOTE
[address]     Admitting diagnosis:  complex right pleural effusion  Pleural effusion    Ht:  5'3\"  Wt: 55.5 kg (122 lb 6.4 oz). Body mass index is 21.68 kg/m².     Labs/Meds reviewed    Diet: Orders Placed This E

## 2019-05-15 NOTE — PLAN OF CARE
Problem:   Right pleural effusion    Data:  Alert and oriented. Room air. Right chest tube to -20 suction. Up with stand by. Refusing bed alarm or assistance to commode. US kidney and bladder pending. Saline locked. Activase X 1 ordered for tonight.  Consul

## 2019-05-16 ENCOUNTER — APPOINTMENT (OUTPATIENT)
Dept: GENERAL RADIOLOGY | Facility: HOSPITAL | Age: 84
DRG: 187 | End: 2019-05-16
Attending: THORACIC SURGERY (CARDIOTHORACIC VASCULAR SURGERY)
Payer: MEDICARE

## 2019-05-16 ENCOUNTER — APPOINTMENT (OUTPATIENT)
Dept: CT IMAGING | Facility: HOSPITAL | Age: 84
DRG: 187 | End: 2019-05-16
Attending: PHYSICIAN ASSISTANT
Payer: MEDICARE

## 2019-05-16 PROCEDURE — 87086 URINE CULTURE/COLONY COUNT: CPT | Performed by: PHYSICIAN ASSISTANT

## 2019-05-16 PROCEDURE — 76377 3D RENDER W/INTRP POSTPROCES: CPT

## 2019-05-16 PROCEDURE — 85025 COMPLETE CBC W/AUTO DIFF WBC: CPT | Performed by: HOSPITALIST

## 2019-05-16 PROCEDURE — 74178 CT ABD&PLV WO CNTR FLWD CNTR: CPT | Performed by: PHYSICIAN ASSISTANT

## 2019-05-16 PROCEDURE — 71046 X-RAY EXAM CHEST 2 VIEWS: CPT | Performed by: THORACIC SURGERY (CARDIOTHORACIC VASCULAR SURGERY)

## 2019-05-16 PROCEDURE — 84153 ASSAY OF PSA TOTAL: CPT | Performed by: PHYSICIAN ASSISTANT

## 2019-05-16 PROCEDURE — 81001 URINALYSIS AUTO W/SCOPE: CPT | Performed by: PHYSICIAN ASSISTANT

## 2019-05-16 PROCEDURE — 80048 BASIC METABOLIC PNL TOTAL CA: CPT | Performed by: HOSPITALIST

## 2019-05-16 RX ORDER — TRAMADOL HYDROCHLORIDE 50 MG/1
50 TABLET ORAL EVERY 6 HOURS PRN
Status: DISCONTINUED | OUTPATIENT
Start: 2019-05-16 | End: 2019-05-17

## 2019-05-16 RX ORDER — MORPHINE SULFATE 4 MG/ML
0.5 INJECTION, SOLUTION INTRAMUSCULAR; INTRAVENOUS EVERY 4 HOURS PRN
Status: DISCONTINUED | OUTPATIENT
Start: 2019-05-16 | End: 2019-05-20

## 2019-05-16 RX ORDER — MORPHINE SULFATE 4 MG/ML
1 INJECTION, SOLUTION INTRAMUSCULAR; INTRAVENOUS EVERY 4 HOURS PRN
Status: DISCONTINUED | OUTPATIENT
Start: 2019-05-16 | End: 2019-05-20

## 2019-05-16 NOTE — PROGRESS NOTES
[facility] Hospitalist Progress Note     Krupa Singh Patient Status:  Inpatient    1922 MRN [id number]   [facility] 5NW-A Attending Sherron Mireles MD   [facility] Day # 8 PCP Abiola Butler MD     CC: follow up    SUBJECTIVE:   Sitting in bed.  Jeni Lubin Continuous Infusing Medication:    PRN Medication:bisacodyl, acetaminophen, ondansetron HCl        Assessment/Plan:      # Loculated R pleural effusion   - s/p R chest tube  - exudative, bloody- chest tube management per pulm  - repeat CT 5/14 mild res

## 2019-05-16 NOTE — PLAN OF CARE
Problem: Patient/Family Goals  Goal: Patient/Family Long Term Goal  Description  Patient's Long Term Goal: 5/8(pm) to go home    Interventions:  - get chest tube out   - See additional Care Plan goals for specific interventions   Outcome: Progressing  Go Manager, , Charge Nurse, Nurse, RT, PT and Pharmacy caring for [employer]. Other care providers present: Rehana Gamble Liaison    Mobility Goal: Ambulate TID, may come off suction to ambulate per cardiothoracic sx    Readmission Risk:     ?  Low

## 2019-05-16 NOTE — PROGRESS NOTES
THORACIC SURGERY PROGRESS NOTE    Subjective: doing well. No complaints. No pain. No shortness of breath     Objective:     05/16/19  0409   BP: 100/67   Pulse: 70   Resp: 16   Temp: 98.8 °F (37.1 °C)     I/O last 3 completed shifts:   In: 12 [P.O.:960]  O

## 2019-05-16 NOTE — PROGRESS NOTES
Pulmonary Progress Note     Assessment / Plan:  1.  Complex pleural effusion  -exudative by criteria  -chest tube output remains significant  -CT w/ some chronic scarring vs consolidation in the medial right base, no distinct lung masses  -small residual ef

## 2019-05-16 NOTE — IMAGING NOTE
Small bore CT placed. 700 ml output last 24 hrs. tPA instilled by clinical service. Possible talc pleurodesis per thorcic sx.   CPM.

## 2019-05-16 NOTE — CONSULTS
[facility] Urology   Consultation Note    Aretha Armas Patient Status:  Inpatient    1922 MRN [id number]   [facility] 5NW-A Attending Diana Sneed MD   [facility] Day # 8 PCP Ayanna Domínguez MD     Reason for Consultation:  Abe Barrett COLONOSCOPY  ~2009    unremarkable     Family History   Problem Relation Age of Onset   • Stroke Father    • No Known Problems Mother       reports that he quit smoking about 49 years ago. His smoking use included cigarettes.  He started smoking about 79 ye 05/16/2019    ESRML 55 05/15/2019    CRP 1.45 05/15/2019     Lab Results   Component Value Date/Time    PSA 9.00 (H) 12/14/2018 09:05 AM    PSA 18.20 (H) 06/08/2018 09:20 AM    PSA 16.700 (H) 12/08/2017 08:59 AM    PSA 14.600 (H) 06/09/2017 09:23 AM    PSA (benign prostatic hypertrophy)     Elevated PSA     Pleural effusion      Right hydronephrosis and bladder mass imaged on US  Elevated serum PSA - very likely he may have prostate cancer    Serum creat is normal  No flank pain    Recommendations:  -Check a

## 2019-05-17 PROCEDURE — 3E0L3GC INTRODUCTION OF OTHER THERAPEUTIC SUBSTANCE INTO PLEURAL CAVITY, PERCUTANEOUS APPROACH: ICD-10-PCS | Performed by: THORACIC SURGERY (CARDIOTHORACIC VASCULAR SURGERY)

## 2019-05-17 PROCEDURE — 85025 COMPLETE CBC W/AUTO DIFF WBC: CPT | Performed by: HOSPITALIST

## 2019-05-17 PROCEDURE — 80048 BASIC METABOLIC PNL TOTAL CA: CPT | Performed by: HOSPITALIST

## 2019-05-17 RX ORDER — TRAMADOL HYDROCHLORIDE 50 MG/1
50 TABLET ORAL EVERY 12 HOURS PRN
Status: DISCONTINUED | OUTPATIENT
Start: 2019-05-17 | End: 2019-05-20

## 2019-05-17 NOTE — PROCEDURES
Procedure note:  Date: May17th, 2019    Preop diagnosis: right recurrent pleural effusion  Postop diagnosis: Same    Procedure: bedside right talc pleurodesis    Surgeon: Andrews Rosales    Anesthesia: 1% lidocaine    Description:    Patient was verbally co

## 2019-05-17 NOTE — PROGRESS NOTES
THORACIC SURGERY PROGRESS NOTE    Subjective: doing well. No complaints. No pain. No shortness of breath     Objective:     05/17/19  1207   BP: 113/58   Pulse: 72   Resp: 16   Temp: 97.7 °F (36.5 °C)     I/O last 3 completed shifts:   In: 12 [P.O.:960]  O

## 2019-05-17 NOTE — PLAN OF CARE
Problem: Patient/Family Goals  Goal: Patient/Family Long Term Goal  Description  Patient's Long Term Goal: 5/8(pm) to go home    Interventions:  - get chest tube out   - See additional Care Plan goals for specific interventions   Outcome: Progressing  Go right side to continuous suction. Instruction to hook up to water seal if patient ambulates. Pt is hard of hearing, has glasses and right hearing aid. Pt is on room air, no complaints of any shortness of breath or pain.   Chest tube is draining well, patie

## 2019-05-17 NOTE — IMAGING NOTE
24 hour right chest tube output of 250 cc is decreased but still significant. Continue chest tube drainage, management per pulmonary service with talc pleurodesis per thoracic surgery.

## 2019-05-17 NOTE — PLAN OF CARE
Patient received bedside talc pleurodesis administered by Dr. Anju Rebolledo at bedside. Patient tolerated procedure well. Denies pain/discomfort. Denies shortness of breath. Maintaining O2 sats on room air. Patient to turn Q30 minutes.  [place] to be reposition

## 2019-05-17 NOTE — PROGRESS NOTES
[facility] Pharmacy Note:  Renal Dose Adjustment for Tramadol Elton Desiretristian Moralez has been prescribed Tramadol (ULTRAM) 50 mg orally every 6 hours as needed for pain. Estimated Creatinine Clearance: 22.7 mL/min (A) (based on SCr of 1.46 mg/dL (H)).     His

## 2019-05-17 NOTE — PROGRESS NOTES
[facility] Hospitalist Progress Note     Jacob Harrington Patient Status:  Inpatient    1922 MRN [id number]   [facility] Attending Becky Hudson MD   [address] Day # 9 PCP Chris Butt MD     CC: follow up    SUBJECTIVE:   Sitting in bed.  Bridgette Alaniz right hydroureteronephrosis again seen with an enhancing filling defect in distal ureter that is highly concerning for a primary urothelial neoplasm.   The filling defect in the right ureter measures up to 3.6 cm in length extending into the right UVJ and m cholecalciferol  2,000 Units Oral Daily   • Levothyroxine Sodium  75 mcg Oral Daily     Continuous Infusing Medication:    PRN Medication:morphINE sulfate **OR** morphINE sulfate, traMADol HCl, bisacodyl, acetaminophen, ondansetron HCl        Assessment/Pl

## 2019-05-17 NOTE — PLAN OF CARE
Problem: Patient/Family Goals  Goal: Patient/Family Long Term Goal  Description  Patient's Long Term Goal: 5/8(pm) to go home    Interventions:  - get chest tube out   - See additional Care Plan goals for specific interventions   Outcome: Progressing  Go trends  Outcome: Progressing     Multidisciplinary Discharge Rounds held 5/17/2019. Treatment team members present today include , , Charge Nurse, Nurse, RT, PT and Pharmacy caring for [employer].      Other care providers prese

## 2019-05-17 NOTE — PROGRESS NOTES
[facility]  Urology Progress Note    Mayte Ayers Patient Status:  Inpatient    1922 MRN [id number]   [facility] 5NW-A Attending Frandy Polo MD   [address] Day # 9 PCP Destin Barreto MD     Subjective:  Mayte Ayers is a(n) 80year old cholecystectomy. SPLEEN:  Calcified granulomas. PANCREAS:  Nonspecific hypodense lesion along the pancreatic head measuring up to 8 mm is incompletely characterized. ADRENALS:  Unremarkable. KIDNEYS:  No urinary calculi identified.   There is severe rig of the urinary bladder which is poorly characterized due to the lack of adequate distention of the urinary bladder measuring approximately 5.0 x 3.1 cm that is also concerning for a   urothelial neoplasm, with volume averaging with the enlarged prostate gl

## 2019-05-17 NOTE — PROGRESS NOTES
Francisco Shay 73 Patient Status:  Inpatient    1922 MRN [id number]   [facility] 5NW-A Attending Pushpa Easton MD   Hosp Day # 9 PCP Josr Fregoso MD     SUBJECTIVE: Wants to do anything that will allow him to go home. cyanosis. no edema                          Skin: No rashes or lesions. Lab Results   Component Value Date    INR 0.86 (L) 05/08/2019    INR 0.87 (L) 04/25/2019          Imaging: I have independently visualized all relevant chest imaging in PACS.

## 2019-05-18 ENCOUNTER — APPOINTMENT (OUTPATIENT)
Dept: GENERAL RADIOLOGY | Facility: HOSPITAL | Age: 84
DRG: 187 | End: 2019-05-18
Attending: INTERNAL MEDICINE
Payer: MEDICARE

## 2019-05-18 PROCEDURE — 71045 X-RAY EXAM CHEST 1 VIEW: CPT | Performed by: INTERNAL MEDICINE

## 2019-05-18 PROCEDURE — 80048 BASIC METABOLIC PNL TOTAL CA: CPT | Performed by: HOSPITALIST

## 2019-05-18 NOTE — PROGRESS NOTES
THORACIC SURGERY PROGRESS NOTE    Subjective: no complaints after talc pleurodesis. Wants to go home    Objective:     05/18/19  1100   BP: 111/58   Pulse: 76   Resp: 16   Temp: 98.3 °F (36.8 °C)     I/O last 3 completed shifts:   In: 480 [P.O.:480]  Out: 6

## 2019-05-18 NOTE — PROGRESS NOTES
PATIENT A&OX4. S/P TALC PLEURODESIS. HE DENIED PAIN/DISCOMFORT DURING HANDOFF. RIGHT CHEST TUBE IN PLACE WITH DRESSING DRY AND INTACT. ACCORDING TO REPORT IT HAD STOPPED DRAINING AFTER THE PROCEDURE. TOTAL OUTPUT ON DAY SHIFT = 250.   DRAINAGE CONTA

## 2019-05-18 NOTE — PROGRESS NOTES
There is no output from chest tube today, Dr Jauregui Pain here wondering about getting cxr ?  Pt diminshed on right side, Dr Suly Villalba was paged, will monitor

## 2019-05-18 NOTE — PROGRESS NOTES
[facility] Hospitalist Progress Note     Lajuan Sacks Patient Status:  Inpatient    1922 MRN [id number]   [facility] 5NW-A Attending Lenny Christiansen MD   Hosp Day # 8 PCP Aileen Franks MD     CC: follow up    SUBJECTIVE:   Sitting in bed. CT 5/14 mild residual effusion, small 9 mm ptx, focus of consolidation rt mid lung w central focus of calcification  - s/p talc pleurodesis 5/17     # Expected anemia  - stable     # Essential HTN  - normotensive off meds  - hold home losartan and nifedipi

## 2019-05-18 NOTE — PROGRESS NOTES
Francisco Shay 73 Patient Status:  Inpatient    1922 MRN [id number]   [facility] 5NW-A Attending Vineet Perez MD   Hosp Day # 8 PCP aTryn Baker MD     SUBJECTIVE: no new events.   Wants to go home     OBJECTIVE:   Value Date     05/18/2019    K 4.1 05/18/2019     05/18/2019    CO2 25.0 05/18/2019    BUN 24 05/18/2019    CREATSERUM 1.19 05/18/2019    GLU 95 05/18/2019    CA 9.1 05/18/2019     Lab Results   Component Value Date    INR 0.86 (L) 05/08/2019

## 2019-05-19 PROCEDURE — 80048 BASIC METABOLIC PNL TOTAL CA: CPT | Performed by: HOSPITALIST

## 2019-05-19 NOTE — PROGRESS NOTES
Signed                     PATIENT A&OX4.  DENIES SOB/CHEST PAIN. SATURATING ABOVE 92% ON ROOM AIR. RIGHT CHEST TUBE TO -20 CONTINUOUS WALL SUCTION. NO NEW DRAINAGE SO FAR. HE DENIES NAUSEA, NO VOMITING. VOIDING ADEQUATE AMOUNTS  PER URINAL.

## 2019-05-19 NOTE — PROGRESS NOTES
Pulmonary Progress Note        NAME: Saskia Galvez - ROOM: 975/233-S - MRN: [id number] - Age: 80year old - : 1922    Past Medical History:   Diagnosis Date   • BPH (benign prostatic hypertrophy)     s/p TURP   • Disorder of thyroid    • Elevated PSA 40* 51* 55*   CA 8.9 9.1 8.8    140 138   K 4.1 4.1 4.1    110 109   CO2 24.0 25.0 26.0     Lab Results   Component Value Date    INR 0.86 (L) 05/08/2019    INR 0.87 (L) 04/25/2019           ASSESSMENT/PLAN:    1.  Complex pleural effusion -exud

## 2019-05-19 NOTE — PROGRESS NOTES
[facility] Hospitalist Progress Note     Oma Fernandez Patient Status:  Inpatient    1922 MRN [id number]   [facility] 5NW-A Attending Mike Rae MD   [facility] Day # 6 PCP Brad Wooten MD     CC: follow up    SUBJECTIVE:   Sitting in bed. CVS, cont suction, possible removal tmrw     # Expected anemia  - stable     # Essential HTN  - normotensive off meds  - hold home losartan and nifedipine      # Hypothryroidism   - cont levothyroxine      # BPH   - cont finasteride      # Hyponatremia-res

## 2019-05-19 NOTE — PROGRESS NOTES
THORACIC SURGERY PROGRESS NOTE    Subjective: no complaints after talc pleurodesis. Wants to go home    Objective:     05/19/19  0455   BP: 104/60   Pulse: 68   Resp: 18   Temp: 97.4 °F (36.3 °C)     I/O last 3 completed shifts:   In: 480 [P.O.:480]  Out: 3

## 2019-05-19 NOTE — PROGRESS NOTES
[facility]  Progress Note    Santo Quesada Patient Status:  Inpatient    1922 MRN [id number]   [facility] 5NW-A Attending Vilma Justice MD   [facility] Day # 6 PCP Levi Pollock MD     Subjective:  Santo Quesada is a(n) 80year old m

## 2019-05-19 NOTE — IMAGING NOTE
Right chest tube output is lower, 140 cc previous 24 hour and 60 cc so far today. CXR yesterday looks improved. Management per CT surgery and pulmonary services.

## 2019-05-20 VITALS
DIASTOLIC BLOOD PRESSURE: 62 MMHG | HEART RATE: 72 BPM | TEMPERATURE: 98 F | SYSTOLIC BLOOD PRESSURE: 105 MMHG | OXYGEN SATURATION: 96 % | BODY MASS INDEX: 22 KG/M2 | RESPIRATION RATE: 18 BRPM | WEIGHT: 122.38 LBS

## 2019-05-20 NOTE — PROGRESS NOTES
NURSING DISCHARGE NOTE    Discharged Home via Wheelchair. Accompanied by Family member  Belongings Taken by patient/family. IV discontinued. VSS. Chest tube removed this morning by Dr. Thomas Marks. Dressing c/d/i. Site c/d/i. No c/o pain.  Discharge in

## 2019-05-20 NOTE — PROGRESS NOTES
THORACIC SURGERY PROGRESS NOTE    Subjective: no complaints. Wants to go home    Objective:     05/20/19  0540   BP: 105/62   Pulse: 72   Resp: 18   Temp: 97.5 °F (36.4 °C)     I/O last 3 completed shifts:   In: 600 [P.O.:600]  Out: 110 [Chest Tube:110]

## 2019-05-20 NOTE — PROGRESS NOTES
PATIENT A&OX4. FORGETFUL TONIGHT. DENIES SOB/CHEST PAIN. SATURATING ABOVE 92% ON ROOM AIR. NO COUGH, NO FEVER.   RIGHT CHEST TUBE TO -20 CONTINUOUS WALL SUCTION.   NO NEW DRAINAGE SO FAR.  HE DENIES NAUSEA, NO VOMITING.   VOIDING ADEQUATE AMOUNTS

## 2019-05-20 NOTE — DISCHARGE SUMMARY
[facility] Internal Medicine Discharge Summary   Patient ID:  Jacob Harrington  [id number]  89 year old  1/17/1922    Admit date: 5/8/2019    Discharge date and time: 5/20/2019     Attending Physician: Anayeli Weeks    Primary Care Physician: MD ROSA Lau And concern for bladder neoplasm and hypodense lesion along the pancreatic head. - repeat BMP pending  - apprec urology review and recommendations - d/w family.  They would like conservative management and plan to discuss their options further with urolog TECHNIQUE:  PA and lateral chest radiographs were obtained. PATIENT STATED HISTORY: (As transcribed by Technologist)  Patient offered no additional history at this time. CONCLUSION:  1. COPD changes are noted.   Stable heart size and tortuous thora the chest. FINDINGS: Interval improvement in a moderate to marked right pleural effusion with right middle and lower lobe atelectasis and/or pneumonia. Underlying mass lesion cannot be excluded. Left lung appears well aerated.  Heart likely normal in size w Registry) which includes the Dose  Index Registry. PATIENT STATED HISTORY: (As transcribed by Technologist)  Patient presents for evaluation of right side pleural space status-post chest tube insertion.     FINDINGS:  LUNGS:  There is atelectasis versus sc No allan aneurysmal dilatation, with the ascending thoracic aorta measuring 39 mm in diameter. 4. There are a few partially calcified lymph nodes involving the right hilum and subcarinal region, suspicious for old granulomatous changes.  5. Please see furth right upper lung zone on image 59 of series 3 additional punctate areas of partially calcified pleural plaques are seen on the left as identified on images 49, 118, 121 and 136 The central airways are patent.  OSSEOUS STRUCTURES: The visualized osseous stru 5.0 x 4.7 cm ECHOGENICITY:  Normal. HYDRONEPHROSIS:  Moderate to marked hydronephrosis CYSTS/STONES/MASSES:  None. LEFT KIDNEY MEASUREMENTS:  11.0 x 5.2 x 4.6 cm ECHOGENICITY:  Normal. HYDRONEPHROSIS:  None. CYSTS/STONES/MASSES:  None.   BLADDER:  Bladder additional history at this time. FINDINGS:  Marked reduction in the amount of pleural fluid since 5/3/2019. The chest tube is coiled inferior right pleural space.   There is probably residual pleural fluid beneath the right lung there is evidence of rig size.  Tortuous thoracic aorta. Aortic atherosclerosis. Mild to moderate right pleural effusion. Right-sided volume loss noted. Right basilar consolidation is seen. CONCLUSION:  No measurable pneumothorax.     Dictated by: Dean Alexander MD on 4 continuous pulse oximetry and cardiac monitoring under my direct supervision. The radiology nurse was in attendance throughout the exam.  Fifty mcg of Fentanyl was given.   Patient was assessed and monitoring of oxygen saturation, heart rate, and blood pre again seen with an enhancing filling defect in distal ureter that is highly concerning for a primary urothelial neoplasm.   The filling defect in the right ureter measures up to 3.6 cm in length extending into the right UVJ and measures up to 1.2 cm in diam are too small to further characterize. Focal fatty infiltration of the liver along the falciform ligament. BILIARY:  Status post cholecystectomy. SPLEEN:  Calcified granulomas.  PANCREAS:  Nonspecific hypodense lesion along the pancreatic head measuring up measures up to 1.2 cm in diameter.   2. There is also enhancing lobulated mass in the inferior aspect of the urinary bladder which is poorly characterized due to the lack of adequate distention of the urinary bladder measuring approximately 5.0 x 3.1 cm rodriguez

## 2019-05-20 NOTE — PROGRESS NOTES
Francisco Shay 73 Patient Status:  Inpatient    1922 MRN [id number]   [facility] 5NW-A Attending Sekou Ellis MD   [facility] Day # 12 PCP Nuzhat Boyle MD     SUBJECTIVE: Pt feels well, denies SOB. Wants to go home. no edema                          Skin: No rashes or lesions. Lab Results   Component Value Date    INR 0.86 (L) 05/08/2019    INR 0.87 (L) 04/25/2019          Imaging: I have independently visualized all relevant chest imaging in PACS.   I agree w

## 2019-05-22 NOTE — CM/SW NOTE
05/22/19 0900   Discharge disposition   Expected discharge disposition Home or Self   Discharge transportation Private car     Patient discharged on 05/20/2019 as previously planned.

## 2019-05-24 PROBLEM — N13.30 HYDRONEPHROSIS OF RIGHT KIDNEY: Status: ACTIVE | Noted: 2019-05-24

## 2019-05-24 PROBLEM — N28.89 URETERAL MASS: Status: ACTIVE | Noted: 2019-05-24

## 2019-05-25 ENCOUNTER — HOSPITAL ENCOUNTER (OUTPATIENT)
Dept: GENERAL RADIOLOGY | Facility: HOSPITAL | Age: 84
Discharge: HOME | End: 2019-05-25
Attending: INTERNAL MEDICINE
Payer: MEDICARE

## 2019-05-25 DIAGNOSIS — J90 PLEURAL EFFUSION: ICD-10-CM

## 2019-05-25 PROCEDURE — 71046 X-RAY EXAM CHEST 2 VIEWS: CPT | Performed by: INTERNAL MEDICINE

## 2019-06-28 ENCOUNTER — APPOINTMENT (OUTPATIENT)
Dept: GENERAL RADIOLOGY | Facility: HOSPITAL | Age: 84
End: 2019-06-28
Attending: EMERGENCY MEDICINE
Payer: MEDICARE

## 2019-06-28 ENCOUNTER — APPOINTMENT (OUTPATIENT)
Dept: CT IMAGING | Facility: HOSPITAL | Age: 84
End: 2019-06-28
Attending: EMERGENCY MEDICINE
Payer: MEDICARE

## 2019-06-28 ENCOUNTER — HOSPITAL ENCOUNTER (EMERGENCY)
Facility: HOSPITAL | Age: 84
Discharge: HOME | End: 2019-06-28
Attending: EMERGENCY MEDICINE
Payer: MEDICARE

## 2019-06-28 VITALS
DIASTOLIC BLOOD PRESSURE: 85 MMHG | SYSTOLIC BLOOD PRESSURE: 158 MMHG | RESPIRATION RATE: 17 BRPM | WEIGHT: 115 LBS | BODY MASS INDEX: 20 KG/M2 | OXYGEN SATURATION: 95 % | HEART RATE: 76 BPM | TEMPERATURE: 98 F

## 2019-06-28 DIAGNOSIS — N30.00 ACUTE CYSTITIS WITHOUT HEMATURIA: Primary | ICD-10-CM

## 2019-06-28 DIAGNOSIS — R53.1 WEAKNESS GENERALIZED: ICD-10-CM

## 2019-06-28 DIAGNOSIS — R68.81 EARLY SATIETY: ICD-10-CM

## 2019-06-28 PROCEDURE — 99285 EMERGENCY DEPT VISIT HI MDM: CPT

## 2019-06-28 PROCEDURE — 87086 URINE CULTURE/COLONY COUNT: CPT | Performed by: EMERGENCY MEDICINE

## 2019-06-28 PROCEDURE — 71045 X-RAY EXAM CHEST 1 VIEW: CPT | Performed by: EMERGENCY MEDICINE

## 2019-06-28 PROCEDURE — 80053 COMPREHEN METABOLIC PANEL: CPT | Performed by: EMERGENCY MEDICINE

## 2019-06-28 PROCEDURE — 96361 HYDRATE IV INFUSION ADD-ON: CPT

## 2019-06-28 PROCEDURE — 93005 ELECTROCARDIOGRAM TRACING: CPT

## 2019-06-28 PROCEDURE — 96360 HYDRATION IV INFUSION INIT: CPT

## 2019-06-28 PROCEDURE — 93010 ELECTROCARDIOGRAM REPORT: CPT

## 2019-06-28 PROCEDURE — 85025 COMPLETE CBC W/AUTO DIFF WBC: CPT | Performed by: EMERGENCY MEDICINE

## 2019-06-28 PROCEDURE — 84145 PROCALCITONIN (PCT): CPT | Performed by: EMERGENCY MEDICINE

## 2019-06-28 PROCEDURE — 70450 CT HEAD/BRAIN W/O DYE: CPT | Performed by: EMERGENCY MEDICINE

## 2019-06-28 PROCEDURE — 84484 ASSAY OF TROPONIN QUANT: CPT | Performed by: EMERGENCY MEDICINE

## 2019-06-28 PROCEDURE — 83605 ASSAY OF LACTIC ACID: CPT | Performed by: EMERGENCY MEDICINE

## 2019-06-28 PROCEDURE — 81001 URINALYSIS AUTO W/SCOPE: CPT | Performed by: EMERGENCY MEDICINE

## 2019-06-28 RX ORDER — CIPROFLOXACIN 250 MG/1
250 TABLET, FILM COATED ORAL 2 TIMES DAILY
Qty: 6 TABLET | Refills: 0 | Status: SHIPPED | OUTPATIENT
Start: 2019-06-28 | End: 2019-07-01

## 2019-06-28 NOTE — ED INITIAL ASSESSMENT (HPI)
Pt here with c/o fatigue, generalized weakness, decreased PO intake, worsening over 1 month since d/c last month. Patient's family member also state noted slurred speech X 1 week.

## 2019-06-28 NOTE — ED PROVIDER NOTES
Patient Seen in: [facility] Emergency Department    History   Patient presents with:  Fatigue (constitutional, neurologic)    Stated Complaint: fatigue x one month    HPI    70-year-old male presents emergency department who is just been going downh (Room air)       Current:/85   Pulse 76   Temp 98.3 °F (36.8 °C) (Temporal)   Resp 17   Wt 52.2 kg   SpO2 95%   BMI 20.37 kg/m²         Physical Exam    Vital signs reviewed  General appearance: Patient is alert and in no acute distress, very frail a Normal   CBC WITH DIFFERENTIAL WITH PLATELET    Narrative: The following orders were created for panel order CBC WITH DIFFERENTIAL WITH PLATELET.   Procedure                               Abnormality         Status                     --------- atelectasis or developing pneumonia. 2. Small right effusion.     Dictated by: Srini Harding MD on 6/28/2019 at 17:24     Approved by: Srini Harding MD              MDM   Patient was made aware of medical condition and instructed to take medications

## (undated) NOTE — ED AVS SNAPSHOT
Jacob Harrington   MRN: [id number]    Department:  [facility] Emergency Department   Date of Visit:  6/28/2019           Disclosure     Insurance plans vary and the physician(s) referred by the ER may not be covered by your plan.  Please contact your in tell this physician (or your personal doctor if your instructions are to return to your personal doctor) about any new or lasting problems. The primary care or specialist physician will see patients referred from the [facility] Emergency Department.  Dheeraj Almodovar

## (undated) NOTE — LETTER
Consent to Procedure/Sedation    Date: _4/25/2019_________    Time: __1435_____________  /  1. I authorize the performance upon Beatris Jones the following:      Bedside ultrasound-guided right-side thoracentesis    2.  I authorize Dr. Hollis__________ (a Signature of person authorized to consent for patient: Relationship to patient:  ___________________________    ___________________    Witness: ____________________     Date: ______________    Printed: 4/25/2019   2:35 PM    Patient Name: Santo Quesada